# Patient Record
Sex: MALE | Race: WHITE | NOT HISPANIC OR LATINO
[De-identification: names, ages, dates, MRNs, and addresses within clinical notes are randomized per-mention and may not be internally consistent; named-entity substitution may affect disease eponyms.]

---
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
## 2020-07-26 ENCOUNTER — EMERGENCY (EMERGENCY)
Facility: HOSPITAL | Age: 20
LOS: 0 days | Discharge: HOME | End: 2020-07-27
Attending: EMERGENCY MEDICINE | Admitting: EMERGENCY MEDICINE
Payer: MEDICAID

## 2020-07-26 VITALS
SYSTOLIC BLOOD PRESSURE: 131 MMHG | DIASTOLIC BLOOD PRESSURE: 78 MMHG | WEIGHT: 160.06 LBS | OXYGEN SATURATION: 100 % | HEART RATE: 62 BPM | TEMPERATURE: 99 F | RESPIRATION RATE: 16 BRPM

## 2020-07-26 DIAGNOSIS — E80.6 OTHER DISORDERS OF BILIRUBIN METABOLISM: ICD-10-CM

## 2020-07-26 DIAGNOSIS — F12.90 CANNABIS USE, UNSPECIFIED, UNCOMPLICATED: ICD-10-CM

## 2020-07-26 DIAGNOSIS — R11.0 NAUSEA: ICD-10-CM

## 2020-07-26 DIAGNOSIS — R10.9 UNSPECIFIED ABDOMINAL PAIN: ICD-10-CM

## 2020-07-26 DIAGNOSIS — R10.13 EPIGASTRIC PAIN: ICD-10-CM

## 2020-07-26 LAB
ALBUMIN SERPL ELPH-MCNC: 5 G/DL — SIGNIFICANT CHANGE UP (ref 3.5–5.2)
ALP SERPL-CCNC: 110 U/L — SIGNIFICANT CHANGE UP (ref 30–115)
ALT FLD-CCNC: 13 U/L — SIGNIFICANT CHANGE UP (ref 13–38)
ANION GAP SERPL CALC-SCNC: 18 MMOL/L — HIGH (ref 7–14)
AST SERPL-CCNC: 19 U/L — SIGNIFICANT CHANGE UP (ref 13–38)
BASOPHILS # BLD AUTO: 0.02 K/UL — SIGNIFICANT CHANGE UP (ref 0–0.2)
BASOPHILS NFR BLD AUTO: 0.2 % — SIGNIFICANT CHANGE UP (ref 0–1)
BILIRUB DIRECT SERPL-MCNC: 0.3 MG/DL — HIGH (ref 0–0.2)
BILIRUB SERPL-MCNC: 1.9 MG/DL — HIGH (ref 0.2–1.2)
BUN SERPL-MCNC: 12 MG/DL — SIGNIFICANT CHANGE UP (ref 10–20)
CALCIUM SERPL-MCNC: 9.6 MG/DL — SIGNIFICANT CHANGE UP (ref 8.5–10.1)
CHLORIDE SERPL-SCNC: 98 MMOL/L — SIGNIFICANT CHANGE UP (ref 98–110)
CO2 SERPL-SCNC: 24 MMOL/L — SIGNIFICANT CHANGE UP (ref 17–32)
CREAT SERPL-MCNC: 0.9 MG/DL — SIGNIFICANT CHANGE UP (ref 0.3–1)
EOSINOPHIL # BLD AUTO: 0.05 K/UL — SIGNIFICANT CHANGE UP (ref 0–0.7)
EOSINOPHIL NFR BLD AUTO: 0.6 % — SIGNIFICANT CHANGE UP (ref 0–8)
GLUCOSE SERPL-MCNC: 97 MG/DL — SIGNIFICANT CHANGE UP (ref 70–99)
HCT VFR BLD CALC: 47.5 % — SIGNIFICANT CHANGE UP (ref 42–52)
HGB BLD-MCNC: 16.7 G/DL — SIGNIFICANT CHANGE UP (ref 14–18)
IMM GRANULOCYTES NFR BLD AUTO: 0.5 % — HIGH (ref 0.1–0.3)
LIDOCAIN IGE QN: 10 U/L — SIGNIFICANT CHANGE UP (ref 7–60)
LYMPHOCYTES # BLD AUTO: 1.58 K/UL — SIGNIFICANT CHANGE UP (ref 1.2–3.4)
LYMPHOCYTES # BLD AUTO: 18.8 % — LOW (ref 20.5–51.1)
MCHC RBC-ENTMCNC: 32.7 PG — HIGH (ref 27–31)
MCHC RBC-ENTMCNC: 35.2 G/DL — SIGNIFICANT CHANGE UP (ref 32–37)
MCV RBC AUTO: 93.1 FL — SIGNIFICANT CHANGE UP (ref 80–94)
MONOCYTES # BLD AUTO: 0.62 K/UL — HIGH (ref 0.1–0.6)
MONOCYTES NFR BLD AUTO: 7.4 % — SIGNIFICANT CHANGE UP (ref 1.7–9.3)
NEUTROPHILS # BLD AUTO: 6.09 K/UL — SIGNIFICANT CHANGE UP (ref 1.4–6.5)
NEUTROPHILS NFR BLD AUTO: 72.5 % — SIGNIFICANT CHANGE UP (ref 42.2–75.2)
NRBC # BLD: 0 /100 WBCS — SIGNIFICANT CHANGE UP (ref 0–0)
PLATELET # BLD AUTO: 237 K/UL — SIGNIFICANT CHANGE UP (ref 130–400)
POTASSIUM SERPL-MCNC: 3.7 MMOL/L — SIGNIFICANT CHANGE UP (ref 3.5–5)
POTASSIUM SERPL-SCNC: 3.7 MMOL/L — SIGNIFICANT CHANGE UP (ref 3.5–5)
PROT SERPL-MCNC: 7.4 G/DL — SIGNIFICANT CHANGE UP (ref 6.1–8)
RBC # BLD: 5.1 M/UL — SIGNIFICANT CHANGE UP (ref 4.7–6.1)
RBC # FLD: 11.7 % — SIGNIFICANT CHANGE UP (ref 11.5–14.5)
SODIUM SERPL-SCNC: 140 MMOL/L — SIGNIFICANT CHANGE UP (ref 135–146)
WBC # BLD: 8.4 K/UL — SIGNIFICANT CHANGE UP (ref 4.8–10.8)
WBC # FLD AUTO: 8.4 K/UL — SIGNIFICANT CHANGE UP (ref 4.8–10.8)

## 2020-07-26 PROCEDURE — 99285 EMERGENCY DEPT VISIT HI MDM: CPT

## 2020-07-26 PROCEDURE — 76705 ECHO EXAM OF ABDOMEN: CPT | Mod: 26

## 2020-07-26 RX ORDER — FAMOTIDINE 10 MG/ML
20 INJECTION INTRAVENOUS ONCE
Refills: 0 | Status: COMPLETED | OUTPATIENT
Start: 2020-07-26 | End: 2020-07-26

## 2020-07-26 RX ORDER — SODIUM CHLORIDE 9 MG/ML
1000 INJECTION INTRAMUSCULAR; INTRAVENOUS; SUBCUTANEOUS ONCE
Refills: 0 | Status: COMPLETED | OUTPATIENT
Start: 2020-07-26 | End: 2020-07-26

## 2020-07-26 RX ORDER — ONDANSETRON 8 MG/1
4 TABLET, FILM COATED ORAL ONCE
Refills: 0 | Status: COMPLETED | OUTPATIENT
Start: 2020-07-26 | End: 2020-07-26

## 2020-07-26 RX ADMIN — ONDANSETRON 4 MILLIGRAM(S): 8 TABLET, FILM COATED ORAL at 21:23

## 2020-07-26 RX ADMIN — FAMOTIDINE 200 MILLIGRAM(S): 10 INJECTION INTRAVENOUS at 20:18

## 2020-07-26 RX ADMIN — ONDANSETRON 8 MILLIGRAM(S): 8 TABLET, FILM COATED ORAL at 20:19

## 2020-07-26 RX ADMIN — SODIUM CHLORIDE 1000 MILLILITER(S): 9 INJECTION INTRAMUSCULAR; INTRAVENOUS; SUBCUTANEOUS at 20:17

## 2020-07-26 RX ADMIN — SODIUM CHLORIDE 1000 MILLILITER(S): 9 INJECTION INTRAMUSCULAR; INTRAVENOUS; SUBCUTANEOUS at 21:23

## 2020-07-26 RX ADMIN — FAMOTIDINE 20 MILLIGRAM(S): 10 INJECTION INTRAVENOUS at 21:22

## 2020-07-26 NOTE — ED PROVIDER NOTE - CARE PROVIDER_API CALL
BK RIVERA  [id number]  [address] [place] [address], [address] [address]  Phone: [phone]  Fax: [phone]  Follow Up Time:

## 2020-07-26 NOTE — ED PROVIDER NOTE - CLINICAL SUMMARY MEDICAL DECISION MAKING FREE TEXT BOX
19yM p/w 24hr of epigastric/LUQ abd pain with nausea w/o vomiting, fever or diarrhea. Pt very well appearing, comfortable, abd soft/benign, labs and RUQ US reassuring.  Pt improved w/ pepcid/zofran, now tolerating PO.  Recommend supportive care, f/u PCP in 1-2d if not improving, return precautions. 19yM p/w 24hr of epigastric/LUQ abd pain with nausea w/o vomiting, fever or diarrhea. Pt very well appearing, comfortable, abd soft/benign, labs reassuring aside from elevated indirect bilirubin.  US w/o liver or GB pathology.  Pt improved w/ pepcid/zofran, now tolerating PO.  Recommend supportive care, f/u PCP in 1-2d if not improving, return precautions.

## 2020-07-26 NOTE — ED PROVIDER NOTE - OBJECTIVE STATEMENT
Pt is a 20 y/o M with no significant PMHx p/w abdominal pain for 1 day. Described as epigastric, non-radiating, constant, 1010 at its worse and currently 9/10. This is the first time he experiences this type of pain. Pain started 24 hours ago prior to going to sleep. He did not have anything to eat today except for oatmeal. heating pad and laying on left side alleviates it . Pain is worsened by standing , drinking or eating. Denies fever, diarrhea. last bm this morning and it was firm. Did not try any medications at home. He reports nausea and 1 episode of self-induced vomiting, which did not help. He denies new foods or eating out. he went hiking day prior to ed visit and reports he only had apple and oatmeal. No sick contacts, COVID exposure, or recent travel. No one at home with similar symptoms. denies sob, cp, rashes. He smoked marijuana prior to onset of symptoms.

## 2020-07-26 NOTE — ED PROVIDER NOTE - PROGRESS NOTE DETAILS
pain is 7/10 after pepcid. cbc, cmp , lipase normal except for elevated bili likely due to dehydration. awaiting US results. Pain is improved. Tolerated PO. will dc on pepcid

## 2020-07-26 NOTE — ED PROVIDER NOTE - CARE PLAN
Principal Discharge DX:	Epigastric pain Principal Discharge DX:	Epigastric pain  Secondary Diagnosis:	Hyperbilirubinemia

## 2020-07-26 NOTE — ED ADULT NURSE NOTE - OBJECTIVE STATEMENT
pt c.o. abdominal pain that started yesterday associated with nausea. Upper epigastric pain radiates towards the left, nausea no vomiting

## 2020-07-26 NOTE — ED PROVIDER NOTE - NSFOLLOWUPINSTRUCTIONS_ED_ALL_ED_FT
Abdominal Pain    Many things can cause abdominal pain. . Your health care provider will do a physical exam to determine if there is a dangerous cause of your pain; blood tests and imaging can sometimes help determine the cause of your pain. However, in many cases, no cause may be found and you may need further testing as an outpatient. Monitor your abdominal pain for any changes.     SEEK IMMEDIATE MEDICAL CARE IF YOU HAVE ANY OF THE FOLLOWING SYMPTOMS: worsening abdominal pain, uncontrollable vomiting, profuse diarrhea, inability to have bowel movements or pass gas, black or bloody stools, fever accompanying chest pain or back pain, or fainting. Your abdomen is larger than usual, more painful, and hard. You have severe pain in your abdomen. You stop passing gas and having bowel movements. These symptoms may represent a serious problem that is an emergency. Do not wait to see if the symptoms will go away. Get medical help right away. Call 911 and do not drive yourself to the hospital.     Please take tylenol 650 mg as needed for pain. Do not exceed 2800 mg in one day. Please make an appointment to see your PCP within one week of discharge. To manage symptoms, you may apply heat on your abdomen for 20 to 30 minutes every 2 hours for as many days as needed. Heat helps decrease pain and muscle spasms.

## 2020-07-26 NOTE — ED PROVIDER NOTE - PHYSICAL EXAMINATION
Constitutional: No acute distress, well appearing, alert and active  Eyes: PERRLA, no conjunctival injection, no eye discharge, EOMI  ENMT: No nasal congestion, no nasal discharge, normal oropharynx, no exudates, no sores , moist mucous membranes  Neck: Supple, no lymphadenopathy  Respiratory: Clear lung sounds bilateral, no wheeze, crackle or rhonchi  Cardiovascular: S1, S2, no murmur, RRR, capillary refill < 2 sec  Gastrointestinal: Bowel sounds positive, Soft, nondistended,  tenderness over epigastric region and RUQ. no hepatosplenomegaly. Ames sign negative. no rebound tenderness.      Skin: No rash

## 2020-07-26 NOTE — ED PROVIDER NOTE - PATIENT PORTAL LINK FT
You can access the FollowMyHealth Patient Portal offered by [facility] by registering at the following website: [email or web address]. By joining [facility]’s FollowMyHealth portal, you will also be able to view your health information using other applications (apps) compatible with our system.

## 2020-07-27 RX ORDER — FAMOTIDINE 10 MG/ML
1 INJECTION INTRAVENOUS
Qty: 14 | Refills: 0
Start: 2020-07-27 | End: 2020-08-09

## 2023-04-27 NOTE — ED PROVIDER NOTE - PRINCIPAL DIAGNOSIS
"[facility] ([facility]) ONLY:   Pain Team Contact information: please page the Pain Team Via [employer]. Search \"Pain\". During daytime hours, please page the attending first. At night please page the resident first.      "
Mouth pain

## 2024-07-01 NOTE — ED PROVIDER NOTE - CCCP TRG CHIEF CMPLNT
July 1, 2024     Ava Auguste,   [address] [employer]  [place] [address]    Patient: Francine Tuttle  YOB: 1962  Date of Visit: 7/1/2024      Dear Dr. Auguste:    Thank you for referring Francine Tuttle to me for evaluation. Below are my notes for this consultation.    If you have questions, please do not hesitate to call me. I look forward to following your patient along with you.         Sincerely,        Adri Abel MD        CC: No Recipients    abdominal pain